# Patient Record
Sex: MALE | Race: ASIAN | NOT HISPANIC OR LATINO | Employment: FULL TIME | URBAN - METROPOLITAN AREA
[De-identification: names, ages, dates, MRNs, and addresses within clinical notes are randomized per-mention and may not be internally consistent; named-entity substitution may affect disease eponyms.]

---

## 2022-03-26 ENCOUNTER — HOSPITAL ENCOUNTER (EMERGENCY)
Facility: HOSPITAL | Age: 32
Discharge: HOME | End: 2022-03-26
Attending: EMERGENCY MEDICINE | Admitting: EMERGENCY MEDICINE
Payer: COMMERCIAL

## 2022-03-26 ENCOUNTER — APPOINTMENT (OUTPATIENT)
Dept: RADIOLOGY | Facility: HOSPITAL | Age: 32
End: 2022-03-26
Attending: EMERGENCY MEDICINE
Payer: COMMERCIAL

## 2022-03-26 VITALS
RESPIRATION RATE: 18 BRPM | HEART RATE: 75 BPM | SYSTOLIC BLOOD PRESSURE: 115 MMHG | WEIGHT: 135 LBS | DIASTOLIC BLOOD PRESSURE: 82 MMHG | OXYGEN SATURATION: 98 % | TEMPERATURE: 98.8 F

## 2022-03-26 DIAGNOSIS — S61.412A LACERATION OF LEFT HAND WITHOUT FOREIGN BODY, INITIAL ENCOUNTER: ICD-10-CM

## 2022-03-26 PROCEDURE — 90715 TDAP VACCINE 7 YRS/> IM: CPT | Performed by: EMERGENCY MEDICINE

## 2022-03-26 PROCEDURE — 250N000009 HC RX 250: Performed by: PHYSICIAN ASSISTANT

## 2022-03-26 PROCEDURE — 250N000011 HC RX IP 250 OP 636: Performed by: PHYSICIAN ASSISTANT

## 2022-03-26 PROCEDURE — 271N000002 HC RX 271: Performed by: PHYSICIAN ASSISTANT

## 2022-03-26 PROCEDURE — 250N000011 HC RX IP 250 OP 636: Performed by: EMERGENCY MEDICINE

## 2022-03-26 PROCEDURE — 73130 X-RAY EXAM OF HAND: CPT | Mod: LT

## 2022-03-26 PROCEDURE — 12002 RPR S/N/AX/GEN/TRNK2.6-7.5CM: CPT

## 2022-03-26 PROCEDURE — 90471 IMMUNIZATION ADMIN: CPT | Performed by: EMERGENCY MEDICINE

## 2022-03-26 PROCEDURE — 99284 EMERGENCY DEPT VISIT MOD MDM: CPT | Mod: 25

## 2022-03-26 RX ORDER — METHYLCELLULOSE 4000CPS 30 %
POWDER (GRAM) MISCELLANEOUS
Status: DISCONTINUED | OUTPATIENT
Start: 2022-03-26 | End: 2022-03-26 | Stop reason: HOSPADM

## 2022-03-26 RX ORDER — METHYLCELLULOSE 4000CPS 30 %
POWDER (GRAM) MISCELLANEOUS
Status: COMPLETED | OUTPATIENT
Start: 2022-03-26 | End: 2022-03-26

## 2022-03-26 RX ADMIN — EPINEPHRINE BITARTRATE 3 ML: 1 POWDER at 17:27

## 2022-03-26 RX ADMIN — CLOSTRIDIUM TETANI TOXOID ANTIGEN (FORMALDEHYDE INACTIVATED), CORYNEBACTERIUM DIPHTHERIAE TOXOID ANTIGEN (FORMALDEHYDE INACTIVATED), BORDETELLA PERTUSSIS TOXOID ANTIGEN (GLUTARALDEHYDE INACTIVATED), BORDETELLA PERTUSSIS FILAMENTOUS HEMAGGLUTININ ANTIGEN (FORMALDEHYDE INACTIVATED), BORDETELLA PERTUSSIS PERTACTIN ANTIGEN, AND BORDETELLA PERTUSSIS FIMBRIAE 2/3 ANTIGEN 0.5 ML: 5; 2; 2.5; 5; 3; 5 INJECTION, SUSPENSION INTRAMUSCULAR at 19:26

## 2022-03-26 RX ADMIN — Medication 150 MG: at 17:27

## 2022-03-26 ASSESSMENT — ENCOUNTER SYMPTOMS: WOUND: 1

## 2022-03-26 NOTE — ED PROVIDER NOTES
EMERGENCY DEPARTMENT ENCOUNTER      NAME: Sita Corado  AGE: 31 year old male  YOB: 1990  MRN: [id number]  EVALUATION DATE & TIME: 3/26/2022  5:04 PM    PCP: No Ref-Primary, Physician    ED PROVIDER: Lindsey Tsagn M.D.      CHIEF COMPLAINT     Chief Complaint   Patient presents with     Laceration     Lt hand       FINAL IMPRESSION:     1. Laceration of left hand without foreign body, initial encounter          MEDICAL DECISION MAKING:       Pertinent Labs & Imaging studies reviewed. (See chart for details)    31 year old male presents to the Emergency Department for evaluation of cut to left hand    Accidentally while using a chisel to chip wool  Unknown last tetanus    3 cm laceration on thenar eminence  NV intact  No fb palpated or visualized  Xray no acute  Repaired with good cosmesis  Notified of possibility of small piece of wood no palpated on exam  Given wound care instructions  Updated on tetanus    Discharged ambulatory in stable condition      Differential Diagnosis (include but not limited to)  fb tendon vascular nerve injury      Vital Signs: reviewed  EKG: none  Imaging: xray no acute  Home Meds:  reviewed  ED meds/abx: tylenol tdap  Fluids: oral    Labs  none    Review of Previous Records  No records of TDAP      Consults      ED COURSE     5:15 PM I met with the patient for the initial interview and physical examination. Discussed plan for treatment and workup in the ED.      6:07 PM updated  Laceration repaired  tdap updated  Feels comfortable with discharge    At the conclusion of the encounter I discussed the results of all of the tests and the disposition. The questions were answered. The patient and partner acknowledged understanding and was agreeable with the care plan.           MEDICATIONS GIVEN IN THE EMERGENCY:     Medications   methylcellulose powder (has no administration in time range)   lidocaine/EPINEPHrine/tetracaine (LET) solution KIT 3 mL (3 mLs Topical Given 3/26/22  1727)   methylcellulose powder (150 mg Topical Given 3/26/22 1727)   Tdap (tetanus-diphtheria-acell pertussis) (ADACEL) injection 0.5 mL (0.5 mLs Intramuscular Given 3/26/22 1926)       NEW PRESCRIPTIONS STARTED AT TODAY'S ER VISIT     There are no discharge medications for this patient.    =================================================================    HPI     Patient information was obtained from: Patient    Use of : N/A         Sita Corado is a 31 year old male with no recorded pertinent medical history who presents by walk-in alone for evaluation of a laceration to his left hand.     Patient states that he was chiseling wood when he slipped and cut his left hand. He states that the chisel was relatively clean, but there is a possibility that wood pieces could have gotten into his laceration. Patient is right handed.     Patient unsure of last tetanus vaccination and no immunization records available.       REVIEW OF SYSTEMS   Review of Systems   Skin: Positive for wound (laceration, left hand).   All other systems reviewed and are negative.       PAST MEDICAL HISTORY:   History reviewed. No pertinent past medical history.    PAST SURGICAL HISTORY:   History reviewed. No pertinent surgical history.      CURRENT MEDICATIONS:   No current outpatient medications on file.       ALLERGIES:   No Known Allergies    FAMILY HISTORY:   History reviewed. No pertinent family history.    SOCIAL HISTORY:     Social History     Socioeconomic History     Marital status: Single     Spouse name: Not on file     Number of children: Not on file     Years of education: Not on file     Highest education level: Not on file   Occupational History     Not on file   Tobacco Use     Smoking status: Not on file     Smokeless tobacco: Not on file   Substance and Sexual Activity     Alcohol use: Not on file     Drug use: Not on file     Sexual activity: Not on file   Other Topics Concern     Not on file   Social History  Narrative     Not on file     Social Determinants of Health     Financial Resource Strain: Not on file   Food Insecurity: Not on file   Transportation Needs: Not on file   Physical Activity: Not on file   Stress: Not on file   Social Connections: Not on file   Intimate Partner Violence: Not on file   Housing Stability: Not on file       VITALS:   /82   Pulse 75   Temp 98.8  F (37.1  C) (Oral)   Resp 18   Wt 61.2 kg (135 lb)   SpO2 98%     PHYSICAL EXAM     Physical Exam  Vitals and nursing note reviewed. Exam conducted with a chaperone present.   Constitutional:       Appearance: Normal appearance.   Abdominal:      Palpations: Abdomen is soft.   Musculoskeletal:      Left hand: Laceration and tenderness present.        Arms:    Neurological:      Mental Status: He is alert.         Physical Exam   Constitutional: Well appearing.     Head: Atraumatic.     Nose: Nose normal.     Mouth/Throat: Oropharynx is clear and moist.     Eyes: EOM are normal. Pupils are equal, round, and reactive to light.     Ears: Bilateral pearly white TMs.     Neck: Normal range of motion. Neck supple.     Cardiovascular: Normal rate, regular rhythm and normal heart sounds.      Pulmonary/Chest: Normal effort  and breath sounds normal.     Musculoskeletal: Normal range of motion.     Neurological: No deficit.     Lymphatics: No edema.     Skin: Skin is warm and dry. Linear laceration in left posterior thenar eminence.     Psychiatric: Normal mood and affect. Behavior is normal.       LAB:     All pertinent labs reviewed and interpreted.  Labs Ordered and Resulted from Time of ED Arrival to Time of ED Departure - No data to display     RADIOLOGY:     Reviewed all pertinent imaging. Please see official radiology report.  XR Hand Left G/E 3 Views   Final Result   IMPRESSION: No radiopaque foreign body or soft tissue gas with attention to the thumb.      Normal joint spaces and alignment. No fracture.           EKG:       I have  independently reviewed and interpreted the EKG(s) documented above.      PROCEDURES:     Procedures   PROCEDURE: Laceration Repair   INDICATIONS: Laceration   PROCEDURE PROVIDER: Dr Lindsey Tsang   SITE: left posterior thenar eminence   TYPE/SIZE: simple, clean and no foreign body visualized  3 cm (total length)   FUNCTIONAL ASSESSMENT: Distal sensation, circulation and motor intact   MEDICATION: 4 mLs of 1% Lidocaine without epinephrine   PREPARATION: irrigation with Normal saline   DEBRIDEMENT: no debridement and wound explored, no foreign body found   CLOSURE:  Wound was closed in   one layer: Skin closed with 4 stitches of 4-0 Ethilon    Total number of sutures/staples placed: 4         I, Alycia Solis, am serving as a scribe to document services personally performed by Dr. Tsang based on my observation and the provider's statements to me. I, Lindsey Tsang MD attest that Alycia Solis is acting in a scribe capacity, has observed my performance of the services and has documented them in accordance with my direction.    Lindsey Tsang M.D.  Emergency Medicine  [facility] EMERGENCY DEPARTMENT  [address] [place] [id number]  [phone]  Dept: [phone]      Lindsey Tsang MD  03/26/22 2019

## 2022-03-26 NOTE — ED TRIAGE NOTES
Laceration palm of left hand from a chisel. Bleeding controlled with pressure. Good sensation of fingers, able to move them well. Unclear as to last tetanus.

## 2022-03-27 NOTE — DISCHARGE INSTRUCTIONS
Read And follow the discharge instructions.    Keep your hand clean and dry.  Do not swim or soak it.    Stitches need to be removed in 3 to 5 days follow-up with your primary care doctor    There is always a possibility of a small piece of wood being in your hand this was not noted when I examined it or during the x-ray but you need to be aware that there is always this possibility.    Watch for signs of infection which includes redness pus discharge or any other concerns return for signs of infection